# Patient Record
Sex: MALE | Race: WHITE | NOT HISPANIC OR LATINO | URBAN - METROPOLITAN AREA
[De-identification: names, ages, dates, MRNs, and addresses within clinical notes are randomized per-mention and may not be internally consistent; named-entity substitution may affect disease eponyms.]

---

## 2019-04-01 ENCOUNTER — EMERGENCY (EMERGENCY)
Facility: HOSPITAL | Age: 39
LOS: 1 days | End: 2019-04-01
Attending: EMERGENCY MEDICINE
Payer: MEDICAID

## 2019-04-01 VITALS
DIASTOLIC BLOOD PRESSURE: 118 MMHG | OXYGEN SATURATION: 97 % | HEART RATE: 104 BPM | TEMPERATURE: 98 F | SYSTOLIC BLOOD PRESSURE: 165 MMHG | HEIGHT: 69 IN | WEIGHT: 192.9 LBS | RESPIRATION RATE: 18 BRPM

## 2019-04-01 LAB
ALBUMIN SERPL ELPH-MCNC: 4.7 G/DL — SIGNIFICANT CHANGE UP (ref 3.3–5)
ALP SERPL-CCNC: 82 U/L — SIGNIFICANT CHANGE UP (ref 40–120)
ALT FLD-CCNC: 65 U/L — HIGH (ref 10–45)
ANION GAP SERPL CALC-SCNC: 16 MMOL/L — SIGNIFICANT CHANGE UP (ref 5–17)
APTT BLD: 28.7 SEC — SIGNIFICANT CHANGE UP (ref 27.5–36.3)
AST SERPL-CCNC: 40 U/L — SIGNIFICANT CHANGE UP (ref 10–40)
BASOPHILS # BLD AUTO: 0 K/UL — SIGNIFICANT CHANGE UP (ref 0–0.2)
BASOPHILS NFR BLD AUTO: 0.3 % — SIGNIFICANT CHANGE UP (ref 0–2)
BILIRUB SERPL-MCNC: 0.5 MG/DL — SIGNIFICANT CHANGE UP (ref 0.2–1.2)
BUN SERPL-MCNC: 17 MG/DL — SIGNIFICANT CHANGE UP (ref 7–23)
CALCIUM SERPL-MCNC: 9.7 MG/DL — SIGNIFICANT CHANGE UP (ref 8.4–10.5)
CHLORIDE SERPL-SCNC: 100 MMOL/L — SIGNIFICANT CHANGE UP (ref 96–108)
CO2 SERPL-SCNC: 21 MMOL/L — LOW (ref 22–31)
CREAT SERPL-MCNC: 0.98 MG/DL — SIGNIFICANT CHANGE UP (ref 0.5–1.3)
D DIMER BLD IA.RAPID-MCNC: <150 NG/ML DDU — SIGNIFICANT CHANGE UP
EOSINOPHIL # BLD AUTO: 0.1 K/UL — SIGNIFICANT CHANGE UP (ref 0–0.5)
EOSINOPHIL NFR BLD AUTO: 2.1 % — SIGNIFICANT CHANGE UP (ref 0–6)
GAS PNL BLDV: SIGNIFICANT CHANGE UP
GLUCOSE SERPL-MCNC: 93 MG/DL — SIGNIFICANT CHANGE UP (ref 70–99)
HCT VFR BLD CALC: 46.6 % — SIGNIFICANT CHANGE UP (ref 39–50)
HGB BLD-MCNC: 15.8 G/DL — SIGNIFICANT CHANGE UP (ref 13–17)
INR BLD: 0.98 RATIO — SIGNIFICANT CHANGE UP (ref 0.88–1.16)
LYMPHOCYTES # BLD AUTO: 1.6 K/UL — SIGNIFICANT CHANGE UP (ref 1–3.3)
LYMPHOCYTES # BLD AUTO: 32.5 % — SIGNIFICANT CHANGE UP (ref 13–44)
MCHC RBC-ENTMCNC: 32.4 PG — SIGNIFICANT CHANGE UP (ref 27–34)
MCHC RBC-ENTMCNC: 33.8 GM/DL — SIGNIFICANT CHANGE UP (ref 32–36)
MCV RBC AUTO: 95.9 FL — SIGNIFICANT CHANGE UP (ref 80–100)
MONOCYTES # BLD AUTO: 0.6 K/UL — SIGNIFICANT CHANGE UP (ref 0–0.9)
MONOCYTES NFR BLD AUTO: 11.6 % — SIGNIFICANT CHANGE UP (ref 2–14)
NEUTROPHILS # BLD AUTO: 2.6 K/UL — SIGNIFICANT CHANGE UP (ref 1.8–7.4)
NEUTROPHILS NFR BLD AUTO: 53.4 % — SIGNIFICANT CHANGE UP (ref 43–77)
PLATELET # BLD AUTO: 309 K/UL — SIGNIFICANT CHANGE UP (ref 150–400)
POTASSIUM SERPL-MCNC: 4.5 MMOL/L — SIGNIFICANT CHANGE UP (ref 3.5–5.3)
POTASSIUM SERPL-SCNC: 4.5 MMOL/L — SIGNIFICANT CHANGE UP (ref 3.5–5.3)
PROT SERPL-MCNC: 7.3 G/DL — SIGNIFICANT CHANGE UP (ref 6–8.3)
PROTHROM AB SERPL-ACNC: 11.2 SEC — SIGNIFICANT CHANGE UP (ref 10–12.9)
RBC # BLD: 4.86 M/UL — SIGNIFICANT CHANGE UP (ref 4.2–5.8)
RBC # FLD: 11.3 % — SIGNIFICANT CHANGE UP (ref 10.3–14.5)
SODIUM SERPL-SCNC: 137 MMOL/L — SIGNIFICANT CHANGE UP (ref 135–145)
TROPONIN T, HIGH SENSITIVITY RESULT: <6 NG/L — SIGNIFICANT CHANGE UP (ref 0–51)
WBC # BLD: 4.9 K/UL — SIGNIFICANT CHANGE UP (ref 3.8–10.5)
WBC # FLD AUTO: 4.9 K/UL — SIGNIFICANT CHANGE UP (ref 3.8–10.5)

## 2019-04-01 PROCEDURE — 80053 COMPREHEN METABOLIC PANEL: CPT

## 2019-04-01 PROCEDURE — 85730 THROMBOPLASTIN TIME PARTIAL: CPT

## 2019-04-01 PROCEDURE — 84132 ASSAY OF SERUM POTASSIUM: CPT

## 2019-04-01 PROCEDURE — 85610 PROTHROMBIN TIME: CPT

## 2019-04-01 PROCEDURE — 84484 ASSAY OF TROPONIN QUANT: CPT

## 2019-04-01 PROCEDURE — 99284 EMERGENCY DEPT VISIT MOD MDM: CPT | Mod: 25

## 2019-04-01 PROCEDURE — 99285 EMERGENCY DEPT VISIT HI MDM: CPT | Mod: 25

## 2019-04-01 PROCEDURE — 93010 ELECTROCARDIOGRAM REPORT: CPT

## 2019-04-01 PROCEDURE — 94640 AIRWAY INHALATION TREATMENT: CPT

## 2019-04-01 PROCEDURE — 85379 FIBRIN DEGRADATION QUANT: CPT

## 2019-04-01 PROCEDURE — 83605 ASSAY OF LACTIC ACID: CPT

## 2019-04-01 PROCEDURE — 85014 HEMATOCRIT: CPT

## 2019-04-01 PROCEDURE — 85027 COMPLETE CBC AUTOMATED: CPT

## 2019-04-01 PROCEDURE — 82803 BLOOD GASES ANY COMBINATION: CPT

## 2019-04-01 PROCEDURE — 82947 ASSAY GLUCOSE BLOOD QUANT: CPT

## 2019-04-01 PROCEDURE — 82330 ASSAY OF CALCIUM: CPT

## 2019-04-01 PROCEDURE — 71046 X-RAY EXAM CHEST 2 VIEWS: CPT

## 2019-04-01 PROCEDURE — 93005 ELECTROCARDIOGRAM TRACING: CPT

## 2019-04-01 PROCEDURE — 84295 ASSAY OF SERUM SODIUM: CPT

## 2019-04-01 PROCEDURE — 82435 ASSAY OF BLOOD CHLORIDE: CPT

## 2019-04-01 RX ORDER — SODIUM CHLORIDE 9 MG/ML
3 INJECTION INTRAMUSCULAR; INTRAVENOUS; SUBCUTANEOUS ONCE
Qty: 0 | Refills: 0 | Status: COMPLETED | OUTPATIENT
Start: 2019-04-01 | End: 2019-04-01

## 2019-04-01 RX ORDER — IPRATROPIUM/ALBUTEROL SULFATE 18-103MCG
3 AEROSOL WITH ADAPTER (GRAM) INHALATION ONCE
Qty: 0 | Refills: 0 | Status: COMPLETED | OUTPATIENT
Start: 2019-04-01 | End: 2019-04-01

## 2019-04-01 RX ADMIN — SODIUM CHLORIDE 3 MILLILITER(S): 9 INJECTION INTRAMUSCULAR; INTRAVENOUS; SUBCUTANEOUS at 21:19

## 2019-04-01 RX ADMIN — Medication 3 MILLILITER(S): at 22:27

## 2019-04-01 NOTE — ED ADULT NURSE NOTE - OBJECTIVE STATEMENT
39 y/o male with PMH asthma, marijuana use last used 3am, cocaine use (1 month ago), no follow up with pcp in 15 years arrives to ED with c/o palpitations, and reporting high blood pressure. Reports he drinks ETOH 3-4 a week and smokes cigarettes. Patient is a/ox3, anxious in appearance. Patient reports no chronic drug use other than listed. Patient denies shortness of breath, chest pain, diaphoresis, patient describes "I feel my blood pressure pounding", reports home BP was 216 yesterday, took his fathers muscle relaxant for relief. No abdominal pain, n/v/d. No edema to BL LE. Reports last ETOH drink was last night- two glasses of red wine. Patient VSS, CM in place HR 90's NSR. No pain at this time.

## 2019-04-01 NOTE — ED PROVIDER NOTE - OBJECTIVE STATEMENT
Patient is 38 y.o male w/ reported hx of asthma and cocaine abuse, presents to ED c/o palpitations and chest discomfort x 1 wk. Pt reports symptoms started after cocaine use and has persisted. Pt reports feeling anxious, heart racing and having elevated bp at home. Pt describes chest discomfort as pressure, non radiating, non exertional, intermittent. Pt denies hx of HTN, P.E/DVT, fever, chills, nausea, vomiting, sob, abdominal pain, lower leg swelling/pain, recent travel or other associated symptoms. Patient is 38 y.o male w/ reported hx of asthma and cocaine abuse, presents to ED c/o palpitations and chest discomfort x 1 wk. Pt reports symptoms started after cocaine use last week Tuesday and has persisted. Pt reports feeling anxious, heart racing and having elevated bp at home. Pt describes chest discomfort as pressure, non radiating, non exertional, intermittent. Pt denies hx of HTN, P.E/DVT, fever, chills, nausea, vomiting, sob, abdominal pain, lower leg swelling/pain, recent travel or other associated symptoms.

## 2019-04-01 NOTE — ED PROVIDER NOTE - PROGRESS NOTE DETAILS
ED PROGRESS NOTE PA NOAH: Pt resting in stretcher in NAD. VSS. Pt ate dinner. Chest x ray no signs of acute pathology, Troponin < 6 and Ddimer negative. Pt without complaints, Ambulatory around unit with steady gait. S1 S2 noted, RRR, lungs CTA b/l, BS x4 with soft, nontender abdomen. c/d/w Dr. Stuart, patient stable for discharge, advised to discontinue use of cocaine and illicit drugs. Follow up with PCP this week. If symptoms worsen, return to ED.

## 2019-04-01 NOTE — ED ADULT NURSE NOTE - NSIMPLEMENTINTERV_GEN_ALL_ED
Implemented All Universal Safety Interventions:  [place] to call system. Call bell, personal items and telephone within reach. Instruct patient to call for assistance. Room bathroom lighting operational. Non-slip footwear when patient is off stretcher. Physically safe environment: no spills, clutter or unnecessary equipment. Stretcher in lowest position, wheels locked, appropriate side rails in place.

## 2019-04-01 NOTE — ED PROVIDER NOTE - NSFOLLOWUPINSTRUCTIONS_ED_ALL_ED_FT
1. Follow up with your PMD within 48-72 hours.   2. Show copies of your reports given to you. discontinue use of cocaine and illicit drugs.   3. Worsening or continued chest pain, shortness of breath, weakness, return to ED.

## 2019-04-02 VITALS
OXYGEN SATURATION: 100 % | HEART RATE: 80 BPM | SYSTOLIC BLOOD PRESSURE: 148 MMHG | DIASTOLIC BLOOD PRESSURE: 84 MMHG | RESPIRATION RATE: 16 BRPM | TEMPERATURE: 98 F

## 2019-04-02 PROCEDURE — 71046 X-RAY EXAM CHEST 2 VIEWS: CPT | Mod: 26

## 2019-04-02 NOTE — ED ADULT NURSE REASSESSMENT NOTE - NS ED NURSE REASSESS COMMENT FT1
Patient discharged from ED by cdu PA. IV removed, discharge paperwork provided. Verbalized understanding of teaching. Vital signs stable, afebrile. Patient leaving unit ambulatory, free from harm.

## 2020-01-01 NOTE — ED PROVIDER NOTE - NSDCPRINTRESULTS_ED_ALL_ED
Patient requests all Lab and Radiology Results on their Discharge Instructions
Follow up with pediatrician in 1-2 days

## 2020-06-21 ENCOUNTER — EMERGENCY (EMERGENCY)
Facility: HOSPITAL | Age: 40
LOS: 1 days | Discharge: HOME | End: 2020-06-21
Attending: PERSONAL EMERGENCY RESPONSE ATTENDANT
Payer: MEDICAID

## 2020-06-21 VITALS
SYSTOLIC BLOOD PRESSURE: 145 MMHG | OXYGEN SATURATION: 97 % | HEIGHT: 70 IN | TEMPERATURE: 98 F | DIASTOLIC BLOOD PRESSURE: 98 MMHG | RESPIRATION RATE: 20 BRPM | HEART RATE: 116 BPM | WEIGHT: 195.11 LBS

## 2020-06-21 VITALS
TEMPERATURE: 98 F | RESPIRATION RATE: 20 BRPM | OXYGEN SATURATION: 100 % | HEART RATE: 82 BPM | DIASTOLIC BLOOD PRESSURE: 88 MMHG | SYSTOLIC BLOOD PRESSURE: 130 MMHG

## 2020-06-21 LAB
ALBUMIN SERPL ELPH-MCNC: 5.3 G/DL — HIGH (ref 3.3–5)
ALP SERPL-CCNC: 78 U/L — SIGNIFICANT CHANGE UP (ref 40–120)
ALT FLD-CCNC: 47 U/L — HIGH (ref 10–45)
ANION GAP SERPL CALC-SCNC: 20 MMOL/L — HIGH (ref 5–17)
AST SERPL-CCNC: 25 U/L — SIGNIFICANT CHANGE UP (ref 10–40)
BASOPHILS # BLD AUTO: 0.03 K/UL — SIGNIFICANT CHANGE UP (ref 0–0.2)
BASOPHILS NFR BLD AUTO: 0.4 % — SIGNIFICANT CHANGE UP (ref 0–2)
BILIRUB SERPL-MCNC: 0.9 MG/DL — SIGNIFICANT CHANGE UP (ref 0.2–1.2)
BUN SERPL-MCNC: 14 MG/DL — SIGNIFICANT CHANGE UP (ref 7–23)
CALCIUM SERPL-MCNC: 10.3 MG/DL — SIGNIFICANT CHANGE UP (ref 8.4–10.5)
CHLORIDE SERPL-SCNC: 98 MMOL/L — SIGNIFICANT CHANGE UP (ref 96–108)
CO2 SERPL-SCNC: 19 MMOL/L — LOW (ref 22–31)
CREAT SERPL-MCNC: 1.05 MG/DL — SIGNIFICANT CHANGE UP (ref 0.5–1.3)
D DIMER BLD IA.RAPID-MCNC: <150 NG/ML DDU — SIGNIFICANT CHANGE UP
EOSINOPHIL # BLD AUTO: 0.05 K/UL — SIGNIFICANT CHANGE UP (ref 0–0.5)
EOSINOPHIL NFR BLD AUTO: 0.6 % — SIGNIFICANT CHANGE UP (ref 0–6)
GLUCOSE SERPL-MCNC: 126 MG/DL — HIGH (ref 70–99)
HCT VFR BLD CALC: 49 % — SIGNIFICANT CHANGE UP (ref 39–50)
HGB BLD-MCNC: 17.7 G/DL — HIGH (ref 13–17)
IMM GRANULOCYTES NFR BLD AUTO: 0.3 % — SIGNIFICANT CHANGE UP (ref 0–1.5)
LYMPHOCYTES # BLD AUTO: 2.18 K/UL — SIGNIFICANT CHANGE UP (ref 1–3.3)
LYMPHOCYTES # BLD AUTO: 28.3 % — SIGNIFICANT CHANGE UP (ref 13–44)
MAGNESIUM SERPL-MCNC: 2.2 MG/DL — SIGNIFICANT CHANGE UP (ref 1.6–2.6)
MCHC RBC-ENTMCNC: 32.1 PG — SIGNIFICANT CHANGE UP (ref 27–34)
MCHC RBC-ENTMCNC: 36.1 GM/DL — HIGH (ref 32–36)
MCV RBC AUTO: 88.8 FL — SIGNIFICANT CHANGE UP (ref 80–100)
MONOCYTES # BLD AUTO: 0.63 K/UL — SIGNIFICANT CHANGE UP (ref 0–0.9)
MONOCYTES NFR BLD AUTO: 8.2 % — SIGNIFICANT CHANGE UP (ref 2–14)
NEUTROPHILS # BLD AUTO: 4.79 K/UL — SIGNIFICANT CHANGE UP (ref 1.8–7.4)
NEUTROPHILS NFR BLD AUTO: 62.2 % — SIGNIFICANT CHANGE UP (ref 43–77)
NRBC # BLD: 0 /100 WBCS — SIGNIFICANT CHANGE UP (ref 0–0)
PHOSPHATE SERPL-MCNC: 2.3 MG/DL — LOW (ref 2.5–4.5)
PLATELET # BLD AUTO: 306 K/UL — SIGNIFICANT CHANGE UP (ref 150–400)
POTASSIUM SERPL-MCNC: 3.5 MMOL/L — SIGNIFICANT CHANGE UP (ref 3.5–5.3)
POTASSIUM SERPL-SCNC: 3.5 MMOL/L — SIGNIFICANT CHANGE UP (ref 3.5–5.3)
PROT SERPL-MCNC: 8.3 G/DL — SIGNIFICANT CHANGE UP (ref 6–8.3)
RBC # BLD: 5.52 M/UL — SIGNIFICANT CHANGE UP (ref 4.2–5.8)
RBC # FLD: 12.4 % — SIGNIFICANT CHANGE UP (ref 10.3–14.5)
SODIUM SERPL-SCNC: 137 MMOL/L — SIGNIFICANT CHANGE UP (ref 135–145)
TROPONIN T, HIGH SENSITIVITY RESULT: <6 NG/L — SIGNIFICANT CHANGE UP (ref 0–51)
WBC # BLD: 7.7 K/UL — SIGNIFICANT CHANGE UP (ref 3.8–10.5)
WBC # FLD AUTO: 7.7 K/UL — SIGNIFICANT CHANGE UP (ref 3.8–10.5)

## 2020-06-21 RX ORDER — SODIUM CHLORIDE 9 MG/ML
1000 INJECTION INTRAMUSCULAR; INTRAVENOUS; SUBCUTANEOUS ONCE
Refills: 0 | Status: COMPLETED | OUTPATIENT
Start: 2020-06-21 | End: 2020-06-21

## 2020-06-21 RX ADMIN — Medication 0.5 MILLIGRAM(S): at 15:52

## 2020-06-21 RX ADMIN — SODIUM CHLORIDE 1000 MILLILITER(S): 9 INJECTION INTRAMUSCULAR; INTRAVENOUS; SUBCUTANEOUS at 13:34

## 2020-06-21 NOTE — ED ADULT NURSE NOTE - OBJECTIVE STATEMENT
The pt is a 40 y/o M PMH panic attack c/o fast HR, difficulty urinating x 2 days. Pt denies fever, chills, SOB, n/v, diarrhea. Upon assessment, pt is AO x 4, clear lung sounds, normal heart sounds, abd soft and nontender, equal strength bilaterally. Pt reports feeling lightheaded upon standing. Pt sinus tach on tele, fall precautions in place, will continue to monitor. The pt is a 40 y/o M PMH panic attack c/o fast HR, difficulty urinating, chest pain 2/10 x 2 days. Pt denies fever, chills, SOB, n/v, diarrhea. Upon assessment, pt is anxious appearing,  AO x 4, clear lung sounds, normal heart sounds, abd soft and nontender, equal strength bilaterally, cap refill <2 seconds, no edema nopted. Pt reports feeling lightheaded upon standing. Pt sinus tach on tele, fall precautions in place, will continue to monitor. The pt is a 38 y/o M PMH panic attack c/o fast HR, difficulty urinating, chest pressure x 2 days. Pt denies fever, chills, SOB, n/v, diarrhea. Upon assessment, pt is anxious appearing,  AO x 4, clear lung sounds, normal heart sounds, abd soft and nontender, equal strength bilaterally, cap refill <2 seconds, no edema nopted. Pt reports feeling lightheaded upon standing. Pt sinus tach on tele, fall precautions in place, will continue to monitor.

## 2020-06-21 NOTE — ED PROVIDER NOTE - PATIENT PORTAL LINK FT
You can access the FollowMyHealth Patient Portal offered by [facility] by registering at the following website: [email or web address]. By joining [facility]’s FollowMyHealth portal, you will also be able to view your health information using other applications (apps) compatible with our system.

## 2020-06-21 NOTE — ED PROVIDER NOTE - PHYSICAL EXAMINATION
CONSTITUTIONAL: Patient is awake, alert and oriented x 3. Patient is anxious appearing and in no acute distress.  HEAD: NCAT,   NECK: supple, FROM  LUNGS: CTA B/L,  HEART: Tachy, RR.+S1S2 no murmurs,   ABDOMEN: Soft nd/nt+bs   EXTREMITY: no edema or calf tenderness b/l, FROM upper and lower ext b/l  SKIN: with no rash or lesions.   NEURO: No focal deficits

## 2020-06-21 NOTE — ED PROVIDER NOTE - OBJECTIVE STATEMENT
39 year old male presents to ED c/o palpitations starting at 6pm last night. Pt reports constant palpitations feeling that his heart is racing associated with anxiety. He reports smoking marijuana almost daily and reports that palpitations started after smoking last night. Had similar palpitations after smoking 2 nights ago but symptoms only lasted 30 minutes. Pt reports that he has never had this happen with smoking in the past but did have a similar event a year ago and was told it was a "panic attack". He reports sensation of "pressure on his heart" today. Pt was given flexeril and Tramadol to his father prior to arrival to help "calm down". Denies HA, dizziness, sob, cough, fevers, abd pain, n/v/d. Pt reports former cocaine use but denies since February 2020. Denies other drug use. Denies daily alcohol use, last drank 3 days ago. Denies SI, HI.

## 2020-06-21 NOTE — ED PROVIDER NOTE - NSFOLLOWUPINSTRUCTIONS_ED_ALL_ED_FT
1. You are to follow up with a primary care physician or psychiatrist in the next 1-2 days for reevaluation. We have additionally arranged for tele follow up, see below for information    2. If needed you may take Ativan .5mg tab for persistent or increased anxiety to bridge over until formal follow up   3. Return to ED for change of symptoms including worsened palpitations, chest pain, headaches, dizziness, severe anxiety, thoughts to hurt yourself or others       Emergency Medicine Telemedicine Follow-Up Program      What is telemedicine?     Telemedicine is a method of video communication between you and your doctor using a computer or mobile device. To participate in telemedicine, you can use your smartphone, tablet, desktop, or laptop. It is a new and exciting way that healthcare providers can stay in close communication with their patients. In the [facility] emergency departments, we are now using telemedicine to speak with patients shortly after their discharge from the emergency department.      How will telemedicine help me after my emergency department visit?    While your visit to the emergency department did not require you to stay in the hospital for additional testing or treatment, your Emergency Medicine provider wants to be sure that you are recovering properly. During your telemedicine appointment, an Emergency Medicine doctor will ask you questions about your health since your visit to the emergency department. Our hope is to prevent unnecessary returns to the emergency department and avoid admission to the hospital when possible.    While we are extremely concerned about your long term health, during the telemedicine follow-up visit we will only be able to address the issue that brought you to the emergency department. To ensure good continuity of care, you will still need to schedule a visit with your primary care provider as you would have otherwise.      How will I know what to do next?    If your Emergency Medicine provider feels that you could benefit from a telemedicine follow-up appointment, they will ask you for your phone number and what time you are available for a telemedicine appointment. You will receive an email with the details of this appointment before you leave the emergency department.    Approximately 15 minutes before your appointment, one of [facility]’s team members will call to ask you to consent to the appointment. Then the team member will help you enter the virtual telemedicine room. Once you are in the virtual room, the telemedicine doctor will join and our team member will then leave the room. This doctor will not be the same doctor who you saw in the emergency department, but they will be able to see the medical records from your emergency department visit.      Will I be charged for the telemedicine visit?    Your insurance will be billed for the telemedicine visit. When you speak with our team member before your appointment, you will be asked to consent to the appointment, including: “I understand that there may be costs associated with a telehealth visit. I agree that I am responsible for any fees associated with the telehealth services that I receive.”    If you do not have insurance, you can participate in telemedicine. You will receive a bill after the visit, then you can call the number located on the bill for payment assistance. The number will direct you to a team member who will assist you with completing financial assistance applications.      What if I need to change or cancel my appointment?    If you need to change or cancel your appointment, or if you have any questions, feel free to call our 24/7 hotline. One of our team members will be able to help you. Our number is [phone].      What do I need to do before I talk to the telemedicine doctor?    We recommend that you connect with a strong Wi-Fi signal on a fully-charged device. Our team member will guide you through entering the telemedicine room.    _________________________________________________________________________________________________________________

## 2020-06-21 NOTE — ED ADULT NURSE NOTE - CHPI ED NUR SYMPTOMS NEG
no shortness of breath/no congestion/no dizziness/no fever/no nausea/no syncope/no diaphoresis/no vomiting/no back pain/no chills no nausea/no vomiting/no syncope/no fever/no chills/no diaphoresis/no dizziness/no chest pain/no back pain/no shortness of breath/no congestion

## 2020-06-21 NOTE — ED PROVIDER NOTE - NSFOLLOWUPCLINICS_GEN_ALL_ED_FT
[facility] General Internal Medicine  General Internal Medicine  [address] [place] [id number]  Phone: [phone]  Fax:     [facility] Psychiatry  Psychiatry  [phone] [place] [address]  Phone: [phone]  Fax:   Follow Up Time:

## 2020-06-21 NOTE — ED ADULT NURSE NOTE - SKIN CAPILLARY REFILL
Abnormal Finding at Your ED Visit    At your ED visit today, you were seen and evaluated for neck swelling, right arm numbness and tingling, groin pain.  - On our evaluation, we discovered an abnormal finding of thyroid cysts and swelling of the thyroid on your CT of your neck.  - It is important that you follow-up with your PCP as you may need further testing, imaging, or workup for this abnormal finding.     2 seconds or less

## 2020-06-21 NOTE — ED PROVIDER NOTE - PROGRESS NOTE DETAILS
Pt feeling improved w/ ativan in ED. Advised to stop consumption of alcohol and stop smoking marijuana asa likley triggers anxiety. Agreed to send 3 .5mg tab ativan to pt pharmacy incase symptoms return to have until pt can follow up. Advised must follow up in 1-2 days w/ pcp or psych. pt agreeable and endorses understanding. d/w dr. crystal, will d/c now   Lori Hazel PA-C

## 2020-06-21 NOTE — ED PROVIDER NOTE - ATTENDING CONTRIBUTION TO CARE
Attending MD Arreaga.  Agree with above.  PT is a 40 yo male with no sig pmhx except for occasional panic attacks.  Presents currently with complaint of palpitations that began last night after smoking marijuana.  Had a similar event last week after smoking and had a similar event 1 yr ago.  Pt states that since the palpitations began he's become increasingly anxious and has developed a pressure-like sensation over his anterior chest.  Dad gave him flexeril and tramadol this morning because he wanted him to 'calm down'.  Pt states he is anxious it's not 'just anxiety'. Pt endorses cocaine use last Feb 2020.  Pt denies routine EtOH.  Last drink on Thursday of this week.  Tachycardia has resolved without intervention. Attending MD Arreaga.  Agree with above.  PT is a 38 yo male with no sig pmhx except for occasional panic attacks.  Presents currently with complaint of palpitations that began last night after smoking marijuana.  Had a similar event last week after smoking and had a similar event 1 yr ago.  Pt states that since the palpitations began he's become increasingly anxious and has developed a pressure-like sensation over his anterior chest.  Dad gave him flexeril and tramadol this morning because he wanted him to 'calm down'.  Pt states he is anxious it's not 'just anxiety'. Pt endorses cocaine use last Feb 2020.  Pt denies routine EtOH.  Last drink on Thursday of this week.  Tachycardia has resolved without intervention.    Pt's labs non-actionable.  PT remains extremely anxious.  Planned ativan for sxs control, counseled to avoid marijuana use/alcohol use.  Stable at time of signout to incoming team.

## 2020-06-21 NOTE — ED PROVIDER NOTE - NSPTACCESSSVCSAPPTDETAILS_ED_ALL_ED_FT
EMERGENGY TELEMEDICINE FOLLOWUP  Diagnosis: Anxiety/Palpitations   Date of Follow-up: Monday 6/22/2020

## 2020-06-21 NOTE — ED ADULT NURSE NOTE - CAS EDP DISCH TYPE
Request for:   Outpatient Medications Marked as Taking for the 12/12/19 encounter (Refill) with Luis Daniel Dunn MD   Medication Sig Dispense Refill   • methylpheniDATE (RITALIN LA) 10 MG 24 hr capsule Take 1 capsule by mouth every morning. Do not start before October 28, 2019. 30 capsule 0   • methylpheniDATE (RITALIN) 5 MG tablet Take 1 tablet by mouth daily. Do not start before October 28, 2019. At 3:00pm 30 tablet 0       Last prescribed: 10/28/19   #30   Refills: 0    Next: 3/25/20  No show(s)/pt cancel: 0  Last:  11/27/19    Verified dose against providers last note.    Per PDMP last filled 10/31/19 #30  PDMP review: Criteria met. Forwarded to Physician/CARLOS for approval/signature.     Routed to  for approval.     70 Home

## 2020-06-23 ENCOUNTER — OUTPATIENT (OUTPATIENT)
Dept: OUTPATIENT SERVICES | Facility: HOSPITAL | Age: 40
LOS: 1 days | Discharge: STILL ADMITTED OR ADMINISTRATIVE | End: 2020-06-23

## 2020-06-24 DIAGNOSIS — F19.980 OTHER PSYCHOACTIVE SUBSTANCE USE, UNSPECIFIED WITH PSYCHOACTIVE SUBSTANCE-INDUCED ANXIETY DISORDER: ICD-10-CM

## 2020-06-24 PROBLEM — Z78.9 OTHER SPECIFIED HEALTH STATUS: Chronic | Status: ACTIVE | Noted: 2019-04-02

## 2021-04-27 ENCOUNTER — EMERGENCY (EMERGENCY)
Facility: HOSPITAL | Age: 41
LOS: 1 days | Discharge: HOME | End: 2021-04-27
Attending: EMERGENCY MEDICINE
Payer: MEDICAID

## 2021-04-27 VITALS
RESPIRATION RATE: 16 BRPM | OXYGEN SATURATION: 98 % | SYSTOLIC BLOOD PRESSURE: 151 MMHG | TEMPERATURE: 97 F | HEART RATE: 90 BPM | DIASTOLIC BLOOD PRESSURE: 92 MMHG

## 2021-04-27 VITALS
HEART RATE: 120 BPM | DIASTOLIC BLOOD PRESSURE: 104 MMHG | OXYGEN SATURATION: 97 % | TEMPERATURE: 98 F | HEIGHT: 69 IN | RESPIRATION RATE: 20 BRPM | WEIGHT: 199.96 LBS | SYSTOLIC BLOOD PRESSURE: 165 MMHG

## 2021-04-27 LAB
ALBUMIN SERPL ELPH-MCNC: 4.7 G/DL — SIGNIFICANT CHANGE UP (ref 3.3–5)
ALP SERPL-CCNC: 81 U/L — SIGNIFICANT CHANGE UP (ref 40–120)
ALT FLD-CCNC: 37 U/L — SIGNIFICANT CHANGE UP (ref 10–45)
ANION GAP SERPL CALC-SCNC: 17 MMOL/L — SIGNIFICANT CHANGE UP (ref 5–17)
AST SERPL-CCNC: 28 U/L — SIGNIFICANT CHANGE UP (ref 10–40)
BASOPHILS # BLD AUTO: 0.03 K/UL — SIGNIFICANT CHANGE UP (ref 0–0.2)
BASOPHILS NFR BLD AUTO: 0.3 % — SIGNIFICANT CHANGE UP (ref 0–2)
BILIRUB SERPL-MCNC: 0.3 MG/DL — SIGNIFICANT CHANGE UP (ref 0.2–1.2)
BUN SERPL-MCNC: 16 MG/DL — SIGNIFICANT CHANGE UP (ref 7–23)
CALCIUM SERPL-MCNC: 9.8 MG/DL — SIGNIFICANT CHANGE UP (ref 8.4–10.5)
CHLORIDE SERPL-SCNC: 103 MMOL/L — SIGNIFICANT CHANGE UP (ref 96–108)
CO2 SERPL-SCNC: 21 MMOL/L — LOW (ref 22–31)
CREAT SERPL-MCNC: 0.98 MG/DL — SIGNIFICANT CHANGE UP (ref 0.5–1.3)
EOSINOPHIL # BLD AUTO: 0.32 K/UL — SIGNIFICANT CHANGE UP (ref 0–0.5)
EOSINOPHIL NFR BLD AUTO: 3 % — SIGNIFICANT CHANGE UP (ref 0–6)
GLUCOSE SERPL-MCNC: 132 MG/DL — HIGH (ref 70–99)
HCT VFR BLD CALC: 45.3 % — SIGNIFICANT CHANGE UP (ref 39–50)
HGB BLD-MCNC: 15.8 G/DL — SIGNIFICANT CHANGE UP (ref 13–17)
IMM GRANULOCYTES NFR BLD AUTO: 0.3 % — SIGNIFICANT CHANGE UP (ref 0–1.5)
LYMPHOCYTES # BLD AUTO: 2.74 K/UL — SIGNIFICANT CHANGE UP (ref 1–3.3)
LYMPHOCYTES # BLD AUTO: 26.1 % — SIGNIFICANT CHANGE UP (ref 13–44)
MAGNESIUM SERPL-MCNC: 2.1 MG/DL — SIGNIFICANT CHANGE UP (ref 1.6–2.6)
MCHC RBC-ENTMCNC: 32 PG — SIGNIFICANT CHANGE UP (ref 27–34)
MCHC RBC-ENTMCNC: 34.9 GM/DL — SIGNIFICANT CHANGE UP (ref 32–36)
MCV RBC AUTO: 91.9 FL — SIGNIFICANT CHANGE UP (ref 80–100)
MONOCYTES # BLD AUTO: 0.97 K/UL — HIGH (ref 0–0.9)
MONOCYTES NFR BLD AUTO: 9.2 % — SIGNIFICANT CHANGE UP (ref 2–14)
NEUTROPHILS # BLD AUTO: 6.41 K/UL — SIGNIFICANT CHANGE UP (ref 1.8–7.4)
NEUTROPHILS NFR BLD AUTO: 61.1 % — SIGNIFICANT CHANGE UP (ref 43–77)
NRBC # BLD: 0 /100 WBCS — SIGNIFICANT CHANGE UP (ref 0–0)
PHOSPHATE SERPL-MCNC: 3.5 MG/DL — SIGNIFICANT CHANGE UP (ref 2.5–4.5)
PLATELET # BLD AUTO: 311 K/UL — SIGNIFICANT CHANGE UP (ref 150–400)
POTASSIUM SERPL-MCNC: 3.8 MMOL/L — SIGNIFICANT CHANGE UP (ref 3.5–5.3)
POTASSIUM SERPL-SCNC: 3.8 MMOL/L — SIGNIFICANT CHANGE UP (ref 3.5–5.3)
PROT SERPL-MCNC: 7.3 G/DL — SIGNIFICANT CHANGE UP (ref 6–8.3)
RBC # BLD: 4.93 M/UL — SIGNIFICANT CHANGE UP (ref 4.2–5.8)
RBC # FLD: 13 % — SIGNIFICANT CHANGE UP (ref 10.3–14.5)
SODIUM SERPL-SCNC: 141 MMOL/L — SIGNIFICANT CHANGE UP (ref 135–145)
TROPONIN T, HIGH SENSITIVITY RESULT: <6 NG/L — SIGNIFICANT CHANGE UP (ref 0–51)
TSH SERPL-MCNC: 1.44 UIU/ML — SIGNIFICANT CHANGE UP (ref 0.27–4.2)
WBC # BLD: 10.5 K/UL — SIGNIFICANT CHANGE UP (ref 3.8–10.5)
WBC # FLD AUTO: 10.5 K/UL — SIGNIFICANT CHANGE UP (ref 3.8–10.5)

## 2021-04-27 PROCEDURE — 93010 ELECTROCARDIOGRAM REPORT: CPT

## 2021-04-27 PROCEDURE — 71046 X-RAY EXAM CHEST 2 VIEWS: CPT | Mod: 26

## 2021-04-27 PROCEDURE — 71046 X-RAY EXAM CHEST 2 VIEWS: CPT

## 2021-04-27 PROCEDURE — 85025 COMPLETE CBC W/AUTO DIFF WBC: CPT

## 2021-04-27 PROCEDURE — 83735 ASSAY OF MAGNESIUM: CPT

## 2021-04-27 PROCEDURE — 80053 COMPREHEN METABOLIC PANEL: CPT

## 2021-04-27 PROCEDURE — 84100 ASSAY OF PHOSPHORUS: CPT

## 2021-04-27 PROCEDURE — 84443 ASSAY THYROID STIM HORMONE: CPT

## 2021-04-27 PROCEDURE — 93005 ELECTROCARDIOGRAM TRACING: CPT

## 2021-04-27 PROCEDURE — 99284 EMERGENCY DEPT VISIT MOD MDM: CPT | Mod: 25

## 2021-04-27 PROCEDURE — 84484 ASSAY OF TROPONIN QUANT: CPT

## 2021-04-27 PROCEDURE — 99285 EMERGENCY DEPT VISIT HI MDM: CPT

## 2021-04-27 RX ORDER — SODIUM CHLORIDE 9 MG/ML
2000 INJECTION INTRAMUSCULAR; INTRAVENOUS; SUBCUTANEOUS ONCE
Refills: 0 | Status: COMPLETED | OUTPATIENT
Start: 2021-04-27 | End: 2021-04-27

## 2021-04-27 RX ADMIN — SODIUM CHLORIDE 2000 MILLILITER(S): 9 INJECTION INTRAMUSCULAR; INTRAVENOUS; SUBCUTANEOUS at 11:17

## 2021-04-27 NOTE — ED PROVIDER NOTE - PATIENT PORTAL LINK FT
You can access the FollowMyHealth Patient Portal offered by [facility] by registering at the following website: [email or web address]. By joining [facility]’s FollowMyHealth portal, you will also be able to view your health information using other applications (apps) compatible with our system.

## 2021-04-27 NOTE — ED PROVIDER NOTE - NS ED ROS FT
CONST: no fevers, no chills, no lightheadedness  HEENT: no vision change, no sore throat  CV: no chest pain, + palpitations  RESP: no cough, no shortness of breath  ABD: no abdominal pain, no nausea/vomiting, no diarrhea  : no dysuria, no hematuria  ENDO: no frequent urination, no unusual thirst  MSK: no musculoskeletal pain  NEURO: no headache, no focal weakness, no loss of sensation  SKIN:  no rash  Psych: + anxiety

## 2021-04-27 NOTE — ED PROVIDER NOTE - ATTENDING CONTRIBUTION TO CARE
attending Tom: 40yM presents with palpitations. Pt reports drinking alcohol and smoking marijuana last night. Reports feeling anxious and dehydrated. Denies other substances. Denies prior withdrawal from alcohol. No vomiting. Was previously on Clonopin for "tachycardia" but had discontinued last year due to side effects. tachycardic, anxious on exam. Will obtain labs, IVF, ekg, reassess.

## 2021-04-27 NOTE — ED PROVIDER NOTE - PHYSICAL EXAMINATION
Gen: NAD, anxious-appearing  HEENT: EOMI, no nasal discharge, mucous membranes moist  CV: sinus tachycardia, +S1/S2, no M/R/G  Resp: CTAB, no W/R/R  GI: Abdomen soft non-distended, NTTP, no masses  MSK: No open wounds, no bruising, no LE edema  Neuro: A&Ox4, following commands, moving all four extremities spontaneously  Psych: anxious, rapid speech   Skin: dry, no rashes

## 2021-04-27 NOTE — ED ADULT NURSE NOTE - OBJECTIVE STATEMENT
39 y/o M, presents ambulatory to ED c/o palpitations, reports he has had similar episodes weekly after binge drinking/smoking marijuana and waking up, reports symptoms typically resolve after he rehydrates. Pt denies any other drug use. Pt was rx klonipin last year "for tachycardia" but self d/tara after 1 week due to side effects. Pt felt mild dizziness this am which resolved after drinking orange juice. Pt denies headache, dizziness, chest pain, cough, SOB, abdominal pain, n/v/d, fevers, chills at this time. PT on [facility], call bell within reach.

## 2021-04-27 NOTE — ED PROVIDER NOTE - OBJECTIVE STATEMENT
40M denies hx p/w palpitations. Reports waking this am w/ sensation of anxiety, palpitations present through to ED visit. States similar episodes weekly after binge drinking/smoking marijuana and waking, symptoms typically resolve after PO rehydration. Pt denies any further drug/stimulant use. States palpitation hx goes back to last year, had been placed on klonipin "for tachycardia" however self d/c'ed after one week 2/2 drug side effects. No fever, n/v/d/c, chest / abd pain, cough, sob, dysuria/hematuria, endorsing some mild dizziness this am which resolved after drinking orange juice. Denies hx of MI, CAD, no prior stress testing. Endorses chronic anxiety, denies any diaphoresis, weight loss.

## 2021-04-27 NOTE — ED PROVIDER NOTE - NSFOLLOWUPINSTRUCTIONS_ED_ALL_ED_FT
Please follow up with your primary care provider for further concerns you may have regarding your general health. Attached you will find your results from today's visit. Continue taking your medications as prescribed and keep your upcoming medical appointments.    Please monitor your alcohol intake and drink plenty of water, you were dehydrated today and your quick heart rate slowed down once you received fluids.

## 2021-04-27 NOTE — ED PROVIDER NOTE - CLINICAL SUMMARY MEDICAL DECISION MAKING FREE TEXT BOX
40M denies hx p/w palpitations after binge drinking, smoking marijuana. Sinus tach to 120s on ekg. Suspect symptoms likely 2/2 dehydration, less likely related to acute cardiac event vs endocrine abnormality, will send screening troponins, labs, CXR, replete fluids, tele, reassess.

## 2021-04-27 NOTE — ED PROVIDER NOTE - PROGRESS NOTE DETAILS
attending Pollack: tachycardia resolved. Pt feeling better. Will PO challenge. david: pt tolerated po, will dc.

## 2022-07-05 NOTE — ED PROVIDER NOTE - CONSTITUTIONAL NEGATIVE STATEMENT, MLM
Last Appointment:  2/2/2022  Future Appointments   Date Time Provider Razia Linares   8/3/2022 10:30 AM DO GENE [facility] no fever and no chills.

## 2022-11-15 NOTE — ED ADULT NURSE NOTE - BREATHING, MLM
RTC as scheduled in Feb for exam and OCT ONH with [facility] after CE, sooner if problems or changes occur. Spontaneous, unlabored and symmetrical

## 2022-12-13 NOTE — ED PROVIDER NOTE - RESPIRATORY WHEEZES
Goal Outcome Evaluation:  Plan of Care Reviewed With: patient, family           Outcome Evaluation: Pt is a 67 y/o M POD 1 s/p L TKA. Pt received in bed upon arrival and agreeable to PT eval. Pt reports he lives with his spouse with a ramp to enter and was (I) with mobility prior to admission but would use a rollator for community distances. Pt completed TKA protocol x 10 prior to mobility. Pt reached sitting EOB with SBA. Pt then stood and ambulated 225' c RW requiring CGA. Pt presents with slow pace but no unsteadiness. Pt returned to sitting UIC at end of session with all needs in reach. PT provided education regarding HEP, home safety, stair navigation, and post-op care. PT recommends home with assist and HHPT. Pt is safe to D/C home today.   DIFFUSE

## 2023-08-15 ENCOUNTER — EMERGENCY (EMERGENCY)
Facility: HOSPITAL | Age: 43
LOS: 1 days | Discharge: HOME | End: 2023-08-15
Admitting: EMERGENCY MEDICINE
Payer: COMMERCIAL

## 2023-08-15 VITALS
HEART RATE: 86 BPM | DIASTOLIC BLOOD PRESSURE: 98 MMHG | OXYGEN SATURATION: 96 % | TEMPERATURE: 98 F | SYSTOLIC BLOOD PRESSURE: 151 MMHG | RESPIRATION RATE: 15 BRPM

## 2023-08-15 VITALS
SYSTOLIC BLOOD PRESSURE: 183 MMHG | RESPIRATION RATE: 22 BRPM | OXYGEN SATURATION: 94 % | TEMPERATURE: 98 F | HEART RATE: 139 BPM | HEIGHT: 70 IN | WEIGHT: 225.09 LBS | DIASTOLIC BLOOD PRESSURE: 103 MMHG

## 2023-08-15 DIAGNOSIS — F41.9 ANXIETY DISORDER, UNSPECIFIED: ICD-10-CM

## 2023-08-15 DIAGNOSIS — E78.5 HYPERLIPIDEMIA, UNSPECIFIED: ICD-10-CM

## 2023-08-15 DIAGNOSIS — R00.2 PALPITATIONS: ICD-10-CM

## 2023-08-15 DIAGNOSIS — R00.0 TACHYCARDIA, UNSPECIFIED: ICD-10-CM

## 2023-08-15 DIAGNOSIS — F12.90 CANNABIS USE, UNSPECIFIED, UNCOMPLICATED: ICD-10-CM

## 2023-08-15 DIAGNOSIS — I10 ESSENTIAL (PRIMARY) HYPERTENSION: ICD-10-CM

## 2023-08-15 LAB
ALBUMIN SERPL ELPH-MCNC: 4.2 G/DL — SIGNIFICANT CHANGE UP (ref 3.4–5)
ALP SERPL-CCNC: 75 U/L — SIGNIFICANT CHANGE UP (ref 40–120)
ALT FLD-CCNC: 92 U/L — HIGH (ref 12–42)
ANION GAP SERPL CALC-SCNC: 12 MMOL/L — SIGNIFICANT CHANGE UP (ref 9–16)
AST SERPL-CCNC: 34 U/L — SIGNIFICANT CHANGE UP (ref 15–37)
BASOPHILS # BLD AUTO: 0.04 K/UL — SIGNIFICANT CHANGE UP (ref 0–0.2)
BASOPHILS NFR BLD AUTO: 0.4 % — SIGNIFICANT CHANGE UP (ref 0–2)
BILIRUB SERPL-MCNC: 0.4 MG/DL — SIGNIFICANT CHANGE UP (ref 0.2–1.2)
BUN SERPL-MCNC: 16 MG/DL — SIGNIFICANT CHANGE UP (ref 7–23)
CALCIUM SERPL-MCNC: 9.9 MG/DL — SIGNIFICANT CHANGE UP (ref 8.5–10.5)
CHLORIDE SERPL-SCNC: 98 MMOL/L — SIGNIFICANT CHANGE UP (ref 96–108)
CO2 SERPL-SCNC: 28 MMOL/L — SIGNIFICANT CHANGE UP (ref 22–31)
CREAT SERPL-MCNC: 1.22 MG/DL — SIGNIFICANT CHANGE UP (ref 0.5–1.3)
EGFR: 75 ML/MIN/1.73M2 — SIGNIFICANT CHANGE UP
EOSINOPHIL # BLD AUTO: 0.17 K/UL — SIGNIFICANT CHANGE UP (ref 0–0.5)
EOSINOPHIL NFR BLD AUTO: 1.9 % — SIGNIFICANT CHANGE UP (ref 0–6)
GLUCOSE SERPL-MCNC: 142 MG/DL — HIGH (ref 70–99)
HCT VFR BLD CALC: 44.2 % — SIGNIFICANT CHANGE UP (ref 39–50)
HGB BLD-MCNC: 15.9 G/DL — SIGNIFICANT CHANGE UP (ref 13–17)
IMM GRANULOCYTES NFR BLD AUTO: 0.4 % — SIGNIFICANT CHANGE UP (ref 0–0.9)
LYMPHOCYTES # BLD AUTO: 3.08 K/UL — SIGNIFICANT CHANGE UP (ref 1–3.3)
LYMPHOCYTES # BLD AUTO: 33.8 % — SIGNIFICANT CHANGE UP (ref 13–44)
MCHC RBC-ENTMCNC: 32.9 PG — SIGNIFICANT CHANGE UP (ref 27–34)
MCHC RBC-ENTMCNC: 36 GM/DL — SIGNIFICANT CHANGE UP (ref 32–36)
MCV RBC AUTO: 91.3 FL — SIGNIFICANT CHANGE UP (ref 80–100)
MONOCYTES # BLD AUTO: 0.8 K/UL — SIGNIFICANT CHANGE UP (ref 0–0.9)
MONOCYTES NFR BLD AUTO: 8.8 % — SIGNIFICANT CHANGE UP (ref 2–14)
NEUTROPHILS # BLD AUTO: 4.97 K/UL — SIGNIFICANT CHANGE UP (ref 1.8–7.4)
NEUTROPHILS NFR BLD AUTO: 54.7 % — SIGNIFICANT CHANGE UP (ref 43–77)
NRBC # BLD: 0 /100 WBCS — SIGNIFICANT CHANGE UP (ref 0–0)
PLATELET # BLD AUTO: 314 K/UL — SIGNIFICANT CHANGE UP (ref 150–400)
POTASSIUM SERPL-MCNC: 3.6 MMOL/L — SIGNIFICANT CHANGE UP (ref 3.5–5.3)
POTASSIUM SERPL-SCNC: 3.6 MMOL/L — SIGNIFICANT CHANGE UP (ref 3.5–5.3)
PROT SERPL-MCNC: 7.8 G/DL — SIGNIFICANT CHANGE UP (ref 6.4–8.2)
RBC # BLD: 4.84 M/UL — SIGNIFICANT CHANGE UP (ref 4.2–5.8)
RBC # FLD: 11.7 % — SIGNIFICANT CHANGE UP (ref 10.3–14.5)
SODIUM SERPL-SCNC: 138 MMOL/L — SIGNIFICANT CHANGE UP (ref 132–145)
TROPONIN I, HIGH SENSITIVITY RESULT: 5.2 NG/L — SIGNIFICANT CHANGE UP
WBC # BLD: 9.1 K/UL — SIGNIFICANT CHANGE UP (ref 3.8–10.5)
WBC # FLD AUTO: 9.1 K/UL — SIGNIFICANT CHANGE UP (ref 3.8–10.5)

## 2023-08-15 PROCEDURE — 99285 EMERGENCY DEPT VISIT HI MDM: CPT

## 2023-08-15 RX ORDER — SODIUM CHLORIDE 9 MG/ML
1000 INJECTION, SOLUTION INTRAVENOUS ONCE
Refills: 0 | Status: COMPLETED | OUTPATIENT
Start: 2023-08-15 | End: 2023-08-15

## 2023-08-15 RX ORDER — DIAZEPAM 5 MG
5 TABLET ORAL ONCE
Refills: 0 | Status: DISCONTINUED | OUTPATIENT
Start: 2023-08-15 | End: 2023-08-15

## 2023-08-15 RX ADMIN — Medication 5 MILLIGRAM(S): at 22:34

## 2023-08-15 RX ADMIN — SODIUM CHLORIDE 1000 MILLILITER(S): 9 INJECTION, SOLUTION INTRAVENOUS at 22:33

## 2023-08-15 NOTE — ED ADULT NURSE NOTE - CHIEF COMPLAINT QUOTE
Pt walked into ER c/o heart beating too fast x30-45 minutes PTA "after taking a hit of weed" while watching TV. Pt denies cp, sob, dizziness, nv, or further at triage. Pt states he took his Metroprolol and a Klonopin PTA. Pt admits to x1 drink of etoh at an  restaurant [facility].

## 2023-08-15 NOTE — ED ADULT TRIAGE NOTE - CHIEF COMPLAINT QUOTE
Pt walked into ER c/o heart beating too fast x30-45 minutes PTA "after taking a hit of weed" while watching TV. Pt denies cp, sob, dizziness, nv, or further at triage. Pt states he took his Metroprolol and a Klonopin PTA.

## 2023-08-15 NOTE — ED ADULT NURSE NOTE - CAS TRG GEN SKIN CONDITION
Needs in person evaluation for 1 month cough, e-visit done 7/14 for same complaint. Refer to PCP or urgent care.
Warm/Dry

## 2023-08-16 NOTE — ED PROVIDER NOTE - CLINICAL SUMMARY MEDICAL DECISION MAKING FREE TEXT BOX
well appearing pt here with exacerbation of their anxiety like symptoms with palpitations after using marijuana no cp or sob, no pe risk factors, well appearing, non ischemic ecg, will r/o cardiac ischemia, electrolyte abnormalities, plan: cbc, cmp, trop

## 2023-08-16 NOTE — ED PROVIDER NOTE - PHYSICAL EXAMINATION
Physical Exam    Vital Signs: I have reviewed the initial vital signs.  Constitutional: well-appearing, appears stated age  Cardiovascular: +S1/S2, no murmurs, +tachycardia rate, regular rhythm, well-perfused extremities  Respiratory: unlabored respiratory effort, clear to auscultation bilaterally, speaks in full sentences  Gastrointestinal: soft, non-tender abdomen, no pulsatile mass

## 2023-08-16 NOTE — ED PROVIDER NOTE - PATIENT PORTAL LINK FT
You can access the FollowMyHealth Patient Portal offered by [facility] by registering at the following website: [email or web address]. By joining [employer]’s FollowMyHealth portal, you will also be able to view your health information using other applications (apps) compatible with our system.

## 2023-08-16 NOTE — ED PROVIDER NOTE - OBJECTIVE STATEMENT
42 yo m no sig pmhx pw acute onset of palpitations after marijuana use and feeling of anxiety similar to previous symptoms but worse pt symptoms have improved somewhat after clonopin and labetalol but reports some residual palpitations. No cp, sob, n/v or diaphoresis. No unilateral swelling, hemoptysis, estogen supplementation, malignancy, recent immobilization or surgery, or prior DVT/PE.    I have reviewed available current nursing and previous documentation of past medical, surgical, family, and/or social history.

## 2023-08-16 NOTE — ED PROVIDER NOTE - NS ED ROS FT
Review of Systems    Constitutional: (-) fever (-) weakness (-) diaphoresis   Cardiovascular: (-) chest pain  Respiratory: (-) SOB (-) cough   GI: (-) abdominal pain (-) N/V (-) diarrhea  Integumentary: (-) rash (-) redness   Neurological:  (-) focal deficit (-) altered mental status

## 2025-01-28 NOTE — ED ADULT NURSE NOTE - [facility] [employer], [employer]
Caller: Kaleb Gordon    Relationship to patient: Self    Best call back number: [phone]    Chief complaint: PT IS UNABLE TO MAKE IT TO HIS APPOINTMENT ON 01.29.25. HE WOULD LIKE TO RESCHEDULE FOR 6 MONTHS OUT IF THAT IS OK. PER HUB WORKFLOW, FURTHER DIRECTION IS REQUIRED. PLEASE REACH OUT TO RESCHEDULE.     Type of visit: FOLLOW UP    Requested date: 6 MONTHS OUT     If rescheduling, when is the original appointment: 01.29.25     Additional notes:    Patent